# Patient Record
Sex: MALE
[De-identification: names, ages, dates, MRNs, and addresses within clinical notes are randomized per-mention and may not be internally consistent; named-entity substitution may affect disease eponyms.]

---

## 2024-02-29 ENCOUNTER — APPOINTMENT (OUTPATIENT)
Dept: PAIN MANAGEMENT | Facility: CLINIC | Age: 79
End: 2024-02-29
Payer: MEDICARE

## 2024-02-29 VITALS — HEIGHT: 70 IN | BODY MASS INDEX: 28.63 KG/M2 | WEIGHT: 200 LBS

## 2024-02-29 PROCEDURE — 99204 OFFICE O/P NEW MOD 45 MIN: CPT

## 2024-02-29 NOTE — DISCUSSION/SUMMARY
[de-identified] : After discussing various treatment options with the patient including but not limited to oral medications, physical therapy, exercise modalities as well as interventional spinal injections, we have decided with the following plan:  - Continue Home exercises, stretching, activity modification, physical therapy, and conservative care. - MRI report and/or images was reviewed and discussed with the patient. - Recommend Right L2-3, L3-4 Transforaminal Epidural Steroid Injection under fluoroscopic guidance with image. - The risks, benefits and alternatives of the proposed procedure were explained in detail with the patient. The risks outlined include but are not limited to infection, bleeding, post-dural puncture headache, nerve injury, a temporary increase in pain, failure to resolve symptoms, allergic reaction, symptom recurrence, and possible elevation of blood sugar in diabetics. All questions were answered to patient's apparent satisfaction and he/she verbalized an understanding. - Patient is presenting with acute/sub-acute radicular pain with impairment in ADLs and functionality.  The pain has not responded to conservative care including NSAID therapy and/or physical therapy.  There is no bleeding tendency, unstable medical condition, or systemic infection. - Follow up in 1-2 weeks post injection for re-evaluation. *pt takes 81mg Aspirin

## 2024-02-29 NOTE — PHYSICAL EXAM
[de-identified] : Constitutional; Appears well, no apparent distress Ability to communicate: Normal  Respiratory: non-labored breathing Skin: No rash noted Head: Normocephalic, atraumatic Neck: no visible thyroid enlargement Eyes: Extraocular movements intact Neurologic: Alert and oriented x3 Psychiatric: normal mood, affect and behavior

## 2024-02-29 NOTE — HISTORY OF PRESENT ILLNESS
[Lower back] : lower back [8] : 8 [Dull/Aching] : dull/aching [Shooting] : shooting [Throbbing] : throbbing [Intermittent] : intermittent [Household chores] : household chores [Nothing helps with pain getting better] : Nothing helps with pain getting better [Standing] : standing [Walking] : walking [FreeTextEntry1] : Initial HPI 02/29/2024: Pain started many years ago and is on the RIGHT side of the lower back and radiates into the right groin described as a sharp pain.  Also with severe hip arthritis and had injection (not under fluoroscopy) which last 3 days,   MRI Lumbar Spine 2/1/24 independently reviewed: L2-3, L3-4 severe central stenosis and moderate/severe B/L NF stenosis; L4-5 severe stenosis and severe b/l NF stenosis  Conservative Care: tried PT with no relief  Pain Medications: Tramadol PRN but PCP won't give more bc having kidney issues; Tylenol PRN  Past Injections: had ESIs in the past.  Spine surgery: none  Blood thinners: *pt takes 81mg Aspirin [] : no [FreeTextEntry6] : arthritis  [FreeTextEntry7] : rt leg  [de-identified] : l mri

## 2024-03-14 ENCOUNTER — APPOINTMENT (OUTPATIENT)
Dept: PAIN MANAGEMENT | Facility: CLINIC | Age: 79
End: 2024-03-14
Payer: MEDICARE

## 2024-03-14 PROCEDURE — 64484 NJX AA&/STRD TFRM EPI L/S EA: CPT | Mod: RT,59

## 2024-03-14 PROCEDURE — J3490M: CUSTOM

## 2024-03-14 PROCEDURE — 64483 NJX AA&/STRD TFRM EPI L/S 1: CPT | Mod: RT

## 2024-03-14 NOTE — PROCEDURE
[FreeTextEntry3] : Date of Service: 03/14/2024   Account: [id number]  Patient: WILLIE MONTERO   YOB: 1945  Age: 79 year   Surgeon:                                                         Neal Li D.O.  Assistant:                                                        None  Pre-Operative Diagnosis:                            Lumbosacral radiculitis (M54.17)  Post-Operative Diagnosis:                          Same  Procedure:                                                     Right L2-3, L3-4 transforaminal epidural steroid injection under fluoroscopic guidance.  Anesthesia:                                                    Local with MAC   This procedure was carried out using fluoroscopic guidance.  The risks and benefits of the procedure were discussed extensively with the patient.  The consent of the patient was obtained and the following procedure was performed. The patient was placed in the prone position on the fluoroscopic table and the lumbar area was prepped and draped in a sterile fashion. A timeout was performed with all essential staff present and the site and side were verified.  The right L2-3 neural foramen was identified on right oblique "aziza dog" anatomical view at the 6 o'clock position using fluoroscopic guidance, and the area was marked. The overlying skin and subcutaneous structures were anesthetized using sterile technique with 1% Lidocaine.  A 22-gauge spinal needle was directed toward the inferior (6 o'clock) position of the pedicle, which formed the roof of the identified foramen.  Once in the epidural space, after negative aspiration for heme and CSF, 1cc of Omnipaque contrast was injected under live fluoroscopy to confirm epidural location and assess filling defects and rule out intravascular needle placement. The following epidurogram observed: no intravascular or intrathecal flow pattern was noted.  No blood or CSF was aspirated. Contrast spread medially in epidural space.    After this, an injectate of 1 cc preservative free normal saline plus 4 mg of dexamethasone and 1 cc of 0.25% bupivacaine was injected in the epidural space.  The right L3-4 neural foramen was then identified on right oblique "aziza dog" anatomical view at the 6 o'clock position using fluoroscopic guidance, and the area was marked. The overlying skin and subcutaneous structures were anesthetized using sterile technique with 1% Lidocaine.  A 22-gauge spinal needle was directed toward the inferior (6 o'clock) position of the pedicle, which formed the roof of the identified foramen.  Once in the epidural space, after negative aspiration for heme and CSF, 1cc of Omnipaque contrast was injected under live fluoroscopy to confirm epidural location and assess filling defects and rule out intravascular needle placement.  The following epidurogram observed: no intravascular or intrathecal flow pattern was noted.  No blood or CSF was aspirated. Contrast spread medially in epidural space.    After this, an injectate of 1 cc preservative free normal saline plus 4 mg of dexamethasone and 1 cc of 0.25% bupivacaine was injected in the epidural space.  The needle was subsequently removed.  Vital signs remained normal.  Pulse oximeter was used throughout the procedure and the patient's pulse and oxygen saturation remained within normal limits.  The patient tolerated the procedure well.  There were no complications.  The patient was instructed to apply ice over the injection sites for twenty minutes every two hours for the next 24 to 48 hours.  The patient was also instructed to contact me immediately if there were any problems.  Neal Li D.O.

## 2024-03-28 ENCOUNTER — APPOINTMENT (OUTPATIENT)
Dept: PAIN MANAGEMENT | Facility: CLINIC | Age: 79
End: 2024-03-28
Payer: MEDICARE

## 2024-03-28 VITALS — HEIGHT: 70 IN | WEIGHT: 200 LBS | BODY MASS INDEX: 28.63 KG/M2

## 2024-03-28 DIAGNOSIS — M43.16 SPONDYLOLISTHESIS, LUMBAR REGION: ICD-10-CM

## 2024-03-28 DIAGNOSIS — M48.061 SPINAL STENOSIS, LUMBAR REGION WITHOUT NEUROGENIC CLAUDICATION: ICD-10-CM

## 2024-03-28 PROCEDURE — 99214 OFFICE O/P EST MOD 30 MIN: CPT

## 2024-03-28 NOTE — DISCUSSION/SUMMARY
[de-identified] : After discussing various treatment options with the patient including but not limited to oral medications, physical therapy, exercise modalities as well as interventional spinal injections, we have decided with the following plan:  - Continue Home exercises, stretching, activity modification, physical therapy, and conservative care. - MRI report and/or images was reviewed and discussed with the patient. - Recommend L2-3 Lumbar Epidural Steroid Injection under fluoroscopic guidance with image. (RIGHT)  - The risks, benefits and alternatives of the proposed procedure were explained in detail with the patient. The risks outlined include but are not limited to infection, bleeding, post-dural puncture headache, nerve injury, a temporary increase in pain, failure to resolve symptoms, allergic reaction, symptom recurrence, and possible elevation of blood sugar in diabetics. All questions were answered to patient's apparent satisfaction and he/she verbalized an understanding. - Patient is presenting with acute/sub-acute radicular pain with impairment in ADLs and functionality.  The pain has not responded to conservative care including NSAID therapy and/or physical therapy. There is no bleeding tendency, unstable medical condition, or systemic infection. - Follow up in 1-2 weeks post injection for re-evaluation. *pt takes 81mg Aspirin

## 2024-03-28 NOTE — HISTORY OF PRESENT ILLNESS
[Lower back] : lower back [8] : 8 [Dull/Aching] : dull/aching [Shooting] : shooting [Throbbing] : throbbing [Household chores] : household chores [Intermittent] : intermittent [Nothing helps with pain getting better] : Nothing helps with pain getting better [Standing] : standing [Walking] : walking [FreeTextEntry1] : 03/28/2024: s/p RIGHT L2-3 L3-4 TFESI on 03/14/24 with 50% relief and improvement of ADLs. Pain was completely better for 2 days and now starting to return but still much better than prior to the injection.   Initial HPI 02/29/2024: Pain started many years ago and is on the RIGHT side of the lower back and radiates into the right groin described as a sharp pain.  Also with severe hip arthritis and had injection (not under fluoroscopy) which last 3 days,   MRI Lumbar Spine 2/1/24 independently reviewed: L2-3, L3-4 severe central stenosis and moderate/severe B/L NF stenosis; L4-5 severe stenosis and severe b/l NF stenosis  Conservative Care: tried PT with no relief  Pain Medications: Tramadol PRN but PCP won't give more bc having kidney issues; Tylenol PRN; can't take NSAIDs bc of ulcers and kidney issues.  Past Injections: had ESIs in the past.  Spine surgery: none  Blood thinners: *pt takes 81mg Aspirin [] : no [FreeTextEntry6] : arthritis  [FreeTextEntry7] : rt leg  [de-identified] : l mri  [TWNoteComboBox1] : 50%

## 2024-03-28 NOTE — PHYSICAL EXAM
[de-identified] : Constitutional; Appears well, no apparent distress Ability to communicate: Normal  Respiratory: non-labored breathing Skin: No rash noted Head: Normocephalic, atraumatic Neck: no visible thyroid enlargement Eyes: Extraocular movements intact Neurologic: Alert and oriented x3 Psychiatric: normal mood, affect and behavior

## 2024-04-04 ENCOUNTER — APPOINTMENT (OUTPATIENT)
Dept: PAIN MANAGEMENT | Facility: CLINIC | Age: 79
End: 2024-04-04
Payer: MEDICARE

## 2024-04-04 DIAGNOSIS — M48.07 SPINAL STENOSIS, LUMBOSACRAL REGION: ICD-10-CM

## 2024-04-04 PROCEDURE — 62323 NJX INTERLAMINAR LMBR/SAC: CPT

## 2024-04-04 NOTE — PROCEDURE
[FreeTextEntry3] : Date of Service: 04/04/2024   Account: [id number]  Patient: WILLIE MONTERO   YOB: 1945  Age: 79 year   Surgeon:                                                         Neal Li D.O.  Pre-Operative Diagnosis:                             Lumbosacral radiculitis (M54.17)  Post-Operative Diagnosis:                           Lumbosacral Radiculitis (M54.17)  Procedure:                                                      Interlaminar lumbar epidural steroid injection (L2-3) under fluoroscopic guidance  Anesthesia:                                                     Local with MAC   This procedure was carried out using fluoroscopic guidance.  The risks and benefits of the procedure were discussed extensively with the patient.  The consent of the patient was obtained and the following procedure was performed.  The patient was placed in the prone position.  The lumbar area was prepped and draped in a sterile fashion.  A timeout was performed with all essential staff present and the site and side were verified.  Under AP view with slight cephalad-caudad angulation, the L2-3 interspace was identified and marked.  Using sterile technique, the superficial skin was anesthetized with 1% Lidocaine without epinephrine.  A 20-gauge Tuohy needle was advanced into the epidural space under fluoroscopy using ldhia-[id number]-[id number] technique and using loss of resistance at the L2-3 level.  After negative aspiration for heme or CSF, an epidurogram was obtained using 2-3 cc Omnipaque contrast injected under live fluoroscopy, confirming epidural placement of the needle.  Epidurogram showed no evidence of intrathecal or intravascular flow, and good evidence of bilateral epidural flow.   After this, 4 cc of preservative free normal saline plus 12 mg of betamethasone were injected into the epidural space.  The needle was subsequently removed.  Anesthesia personnel were present throughout the procedure.  The patient tolerated the procedure well and was instructed to contact me immediately if there were any problems.  Neal Li D.O.